# Patient Record
Sex: MALE | Race: BLACK OR AFRICAN AMERICAN
[De-identification: names, ages, dates, MRNs, and addresses within clinical notes are randomized per-mention and may not be internally consistent; named-entity substitution may affect disease eponyms.]

---

## 2021-02-20 ENCOUNTER — HOSPITAL ENCOUNTER (EMERGENCY)
Dept: HOSPITAL 53 - M ED | Age: 26
Discharge: HOME | End: 2021-02-20
Payer: COMMERCIAL

## 2021-02-20 VITALS — SYSTOLIC BLOOD PRESSURE: 114 MMHG | DIASTOLIC BLOOD PRESSURE: 78 MMHG

## 2021-02-20 VITALS — BODY MASS INDEX: 24.71 KG/M2 | HEIGHT: 67 IN | WEIGHT: 157.41 LBS

## 2021-02-20 DIAGNOSIS — S06.0X9A: Primary | ICD-10-CM
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
## 2021-02-20 PROCEDURE — 70450 CT HEAD/BRAIN W/O DYE: CPT

## 2021-02-20 PROCEDURE — 80047 BASIC METABLC PNL IONIZED CA: CPT

## 2021-02-20 PROCEDURE — 72125 CT NECK SPINE W/O DYE: CPT

## 2021-02-20 PROCEDURE — 36415 COLL VENOUS BLD VENIPUNCTURE: CPT

## 2021-02-20 PROCEDURE — 99284 EMERGENCY DEPT VISIT MOD MDM: CPT

## 2021-02-20 PROCEDURE — 96374 THER/PROPH/DIAG INJ IV PUSH: CPT

## 2021-02-20 NOTE — REP
INDICATION:

fall, headache.



COMPARISON:

None.



TECHNIQUE:

CT BRAIN PERFORMED IN THE AXIAL PLANE.  CORONAL RECONSTRUCTION IMAGES ARE PERFORMED.



FINDINGS:

THE VENTRICLES ARE NORMAL IN SIZE AND POSITION.  THERE IS NO MIDLINE SHIFT OR MASS

EFFECT.  GRAY-WHITE DIFFERENTIATION IS WELL MAINTAINED.  THERE IS NO ACUTE

INTRACRANIAL HEMORRHAGE OR EXTRA-AXIAL FLUID COLLECTION.  BONE WINDOW EXAMINATION IS

UNREMARKABLE.  VISUALIZED MASTOID AIR CELLS AND PARANASAL SINUSES ARE CLEAR.



IMPRESSION:

NEGATIVE NONCONTRAST CT BRAIN.





<Electronically signed by Sam Alejandro > 02/20/21 [id number]

## 2021-02-20 NOTE — REP
INDICATION:

fall, headache.



COMPARISON:

None.



TECHNIQUE:

Axial soft tissue and bone windows reviewed with coronal and sagittal bone window

reconstructions provided.



FINDINGS:

Sagittal images show vertebral body heights and disc spaces maintained.  There is no

compression deformity or malalignment.  There is no prevertebral soft tissue swelling.

The dens along with the anterior arch and lateral masses of C1 align normally on all

projections.  There is no central canal or foraminal stenosis.  The spinous processes,

lamina, pedicles, facets and transverse processes were all intact.  Skull base bone

windows and mastoid aeration unremarkable.  Limited visualization of the upper

thoracic vertebral levels and 1st few ribs along with lung apices all unremarkable.



IMPRESSION:

Normal noncontrast CT cervical spine.  No compression fracture, malalignment, spinal

or foraminal stenosis nor other acute finding.





<Electronically signed by Sam Alejandro > 02/20/21 [id number]

## 2021-02-20 NOTE — CCD
Summarization Of Episode

                             Created on: 2021



VANDANA LORENZANA

External Reference #: [id number]

: 1995

Sex: Male



Demographics





                          Address                   [address]

[place]  [address]

 

                          Home Phone                [phone]

 

                          Preferred Language        English

 

                          Marital Status            

 

                          Christianity Affiliation     IS

 

                          Race                      Black or 

 

                          Ethnic Group              Not  or 





Author





                          Author                    [facility]

 

                          Organization              [facility]

 

                          Address                   Unknown

 

                          Phone                     Unavailable







Support





                Name            Relationship    Address         Phone

 

                    [facility]             Next Of Kin         [address]

[place]

[place]  [address]                    Unavailable

 

                    ROBERTO LORENZANA    Next Of Kin         [address]

[address]

[place]  [address]                        [phone]







Care Team Providers





                    Care Team Member Name Role                Phone

 

                    FLORI OLIVER Unavailable         Unavailable

 

                    NO,  PCP            Unavailable         Unavailable



                                  



Re-disclosure Warning

          The records that you are about to access may contain information from 
federally-assisted alcohol or drug abuse programs. If such information is 
present, then the following federally mandated warning applies: This information
has been disclosed to you from records protected by federal confidentiality 
rules (42 CFR part 2). The federal rules prohibit you from making any further 
disclosure of this information unless further disclosure is expressly permitted 
by the written consent of the person to whom it pertains or as otherwise 
permitted by 42 CFR part 2. A general authorization for the release of medical 
or other information is NOT sufficient for this purpose. The Federal rules 
restrict any use of the information to criminally investigate or prosecute any 
alcohol or drug abuse patient.The records that you are about to access may 
contain highly sensitive health information, the redisclosure of which is 
protected by Article 27-F of the [facility] Public Health law. If you 
continue you may have access to information: Regarding HIV / AIDS; Provided by 
facilities licensed or operated by the [facility] Office of Mental Health; 
or Provided by the [facility] Office for People With Developmental 
Disabilities. If such information is present, then the following [place] 
mandated warning applies: This information has been disclosed to you from 
confidential records which are protected by state law. State law prohibits you 
from making any further disclosure of this information without the specific 
written consent of the person to whom it pertains, or as otherwise permitted by 
law. Any unauthorized further disclosure in violation of state law may result in
a fine or detention sentence or both. A general authorization for the release of 
medical or other information is NOT sufficient authorization for further disc
losure.                                                                         
    



Encounters

          



           Encounter  Providers  Location   Date       Indications Data Source(s

)

 

                Emergency       Attender: JOZEF OLIVERConsultant: PCP NO 

                2021 12:30:00 

PM EST - 2021 03:14:00 PM EST                           [facility]

ital

 

                                        Patient discharged. 



                                                                    



Insurance Providers

          



             Payer name   Policy type / Coverage type Policy ID    Covered party

 ID Covered 

party's relationship to kothari Policy Kothari             Plan Information

 

          [facility] [employer]           [id number]           SP             

     [id number]

 

          [facility] [employer] - O/P           [id number]           [id number]            

      [id number]



                                                                                
                 



Results

          



                    ID                  Date                Data Source

 

                    [id number]     2021 01:02:00 PM [facility] [address] [place] [address] PHONE: [phone]

FAX: [phone]  Name .................. : SALOMÓN MEJIA               Acct 
Number.................. : [id number] ROOM. ................. : TR-07             
            Number ................... : [id number] Stay type ............. : E/R 
                         Discharge Date......... ... : 21 Admit Date ....
..... : 21                         Admit Phys .................... : 
MELODY Date of Birth ....... : 1995                      Family Phys 
................... : NO PCP Phone .................. : [phone]            
    Age ................................ : 25 Film# .................. .:[id number] 
                     Sex ................................. : M  Unsigned 
transcriptions are preliminary reports and do not represent a medical or legal 
document         CT THORAX W/CONTRAST                [id number]     COMPLETE:21
19:12 IRWIN 4606                                        Reason(s): Trauma/Injury  
  CT OF THE CHEST WITH CONTRAST:  INDICATION: Trauma.  FINDINGS: The neck base 
is clear.  The lungs are clear.  The heart is normal in size. No 
lymphadenopathy. No vascular injury.  There is no acute osseous abnormality.  
The visualized upper abdomen demonstrates no acute abnormality.  IMPRESSION: No 
acute traumatic injury of the chest.  While performing the above CT examination,
radiation dose reduction was accomplished utilizing automated exposure control, 
adjusting of the mA and kV based on the patient's body size and/or the use of 
imperative reconstructive techniques.  CT dose: 393.7 mGycm  Contrast agent in 
mL: 75 Isovue 370  Method of administration: Intravenous   
___________________________________ Electronically Reviewed and Signed By Juma Gu M.D.              , 21 13:02, [facility]                               
                                                                    Page 1 of [address] [place] [address] PHONE: [phone] 
FAX: [phone]  Name .................. : SALOMÓN MEJIA                 
Acct Number.................. : [id number] ROOM. ................. : TR-07        
                   Number ................... : [id number] Stay type .............
: E/R                             Discharge Date......... ... : 21 Admit 
Date ......... : 21                           Admit Phys ............
........ : MELOYD Date of Birth ....... : 1995                        
Family Phys ................... : NO PCP Phone .................. : [phone]
                  Age ................................ : 25 Film# 
.................. .:[id number]                         Sex 
................................. : M  Unsigned transcriptions are preliminary 
reports and do not represent a medical or legal document         CT THORAX 
W/CONTRAST                  [id number]     COMPLETE:21 19:12 IRWIN 4606          
                               Reason(s): Trauma/Injury    Transcribe Initials: 
KENIA , Transcribe Date: 21 20:50, Dictation Date:   Copy for: NEVIN FAGAN                      via fax Copy for: EMERGENCY DEPT                    
via modem Copy for: 710 MED REC DISCHARGED                                      
                                                                  Page 2 of 2   









          Name      Value     Range     Interpretation Code Description Data Gisele

rce(s) Supporting 

Document(s)

 

                                                                       









                    ID                  Date                Data Source

 

                    [id number]     2021 12:30:00 [place] [address] PHONE: [phone]

FAX: [phone]  Name .................. : LORENZANA VANDANA ROBERTO              Acct 
Number.................. : [id number] ROOM. ................. : TR-07             
          MR Number ................... : [id number] Stay type ............. : E/R  
                       Discharge Date......... ... : Admit Date ......... :                         Admit Phys .................... : MELODY Date of
Birth ....... : 1995                     Family Phys ................... :
NO PCP Phone .................. : [phone]                Age 
................................ : 25 Film# .................. .:[id number]         
            Sex ................................. : M  Unsigned transcriptions 
are preliminary reports and do not represent a medical or legal document        
 CT CERV SPINE W/O CONTRAS 55040       COMPLETE:21 12:39                
4608                                Reason(s): Trauma/Injury     CT SCAN OF THE 
CERVICAL SPINE WITHOUT CONTRAST, 21:  INDICATION: Trauma/injury.  
FINDINGS: Scans were obtained from C1 through C7. The discs appeared normal. No 
bony abnormalities, spinal stenosis, abnormal paravertebral process or 
significant degenerative changes are seen. There is a 4 mm bone island 
identified in the left occiput. Examination is otherwise unremarkable.  
IMPRESSION: Negative cervical spine CT scan.  While performing the above CT 
examination, radiation dose reduction was accomplished utilizing automated 
exposure control, adjusting of the mA and kV based on the patient's body size 
and/or the use of imperative reconstructive techniques.  CT dose 1087.8 mGycm.  
Examination dictated by GABINO Cartwright. Examination was reviewed with 
Mtat Dwyer MD, radiologist at the time of this dictation.    
___________________________________ Electronically Reviewed and Signed By Matt Dywer MD                 , 21 12:30, [facility]  Transcribe Initials: [facility], 
Transcribe Date: 21 14:20, Dictation Date:   Copy for: NEVIN FAGAN    
              via fax                                                           
                                       Page 1 of 2 [facility] [address] [place] [address] PHONE: [phone] FAX: [phone]  Name 
.................. : SALOMÓN MEJIA               Acct Number..................
: [id number] ROOM. ................. : TR-                         MR Number 
................... : [id number] Stay type ............. : E/R                      
    Discharge Date......... ... : Admit Date ......... : 21               
         Admit Phys .................... : MELODY Date of Birth ....... : 
1995                      Family Phys ................... : NO PCP Phone 
.................. : [phone]                 Age 
................................ : 25 Film# .................. .:[id number]         
             Sex ................................. : M  Unsigned transcriptions 
are preliminary reports and do not represent a medical or legal document        
 CT CERV SPINE W/O CONTRAS 82106       COMPLETE:21 12:39                 
4608                                Reason(s): Trauma/Injury   Copy for: 
EMERGENCY DEPT                    via modem Copy for: 710 MED REC DISCHARGED    
                                                                                
                 Page 2 of 2   









          Name      Value     Range     Interpretation Code Description Data Gisele

rce(s) Supporting 

Document(s)

 

                                                                       









                    ID                  Date                Data Source

 

                    [id number]     2021 12:29:00 PM EST [place] [address]

[place] [address] PHONE: [phone]

FAX: [phone]  Name .................. : SLAOMÓN MEJIA               Acct 
Number.................. : [id number] ROOM. ................. : [facility]             
           MR Number ................... : [id number] Stay type ............. : E/R 
                         Discharge Date......... ... : Admit Date ......... : 0
21                         Admit Phys .................... : [facility] 
Date of Birth ....... : 1995                      Family Phys 
................... : NO PCP Phone .................. : [phone]            
    Age ................................ : 25 Film# .................. .:[id number] 
                     Sex ................................. : M  Unsigned 
transcriptions are preliminary reports and do not represent a medical or legal 
document           CT HEAD W/O CONTRAST              11020    COMPLETE:21 
12:39          2067                                        Reason(s): Head 
Injury     CT SCAN OF THE HEAD WITHOUT CONTRAST, 21:  INDICATION: Head 
injury.  FINDINGS: The ventricular system is normal.  No evidence for 
parenchymal loss is noted. No evidence for masses or mass effects are 
identified. No evidence for subdural, epidural, or subarachnoid hemorrhage is 
noted. The bones and soft tissues also appear normal.  IMPRESSION: Unremarkable 
CT scan of the head without intravenous contrast.  While performing the above CT
examination, radiation dose reduction was accomplished utilizing automated 
exposure control, adjusting of the mA and kV based on the patient's body size 
and/or the use of imperative reconstructive techniques.  CT dose 894.8 mGycm.  
Examination dictated by GABINO Cartwright. Examination was reviewed with 
Matt Dwyer MD, radiologist at the time of this dictation.    
___________________________________ Electronically Reviewed and Signed By Matt Dwyer MD                 , 21 12:29, [facility]  Transcribe Initials: [facility], 
Transcribe Date: 21 14:19, Dictation Date:                                
                                                                     Page 1 of 2
[place] [address] PHONE: [phone] 
FAX: [phone]  Name .................. : SALOMÓN ROSS ROBERTO                  
Acct Number.................. : [id number] ROOM. ................. : TR-07        
                   MR Number ................... : [id number] Stay type 
............. : E/R                              Discharge Date......... ... : 
Admit Date ......... : 21                            Admit Phys .....
............... : [facility] Date of Birth ....... : 1995                 
       Family Phys ................... : NO PCP Phone .................. : 
[phone]                    Age ................................ : 25 Film# 
.................. .:[id number]                          Sex 
................................. : M  Unsigned transcriptions are preliminary 
reports and do not represent a medical or legal document           CT HEAD W/O 
CONTRAST                 07757    COMPLETE:21 12:39          4607         
                                 Reason(s): Head Injury    Copy for: NEVIN FAGAN                      via fax Copy for: EMERGENCY DEPT                    
via modem Copy for: 710 MED REC DISCHARGED                                      
                                                                   Page 2 of 2  










          Name      Value     Range     Interpretation Code Description Data Gisele

rce(s) Supporting 

Document(s)

 

                                                                       









                    ID                  Date                Data Source

 

                    [id number]     2021 12:28:00 [place] [address] PHONE: [phone]

FAX: [phone]  Name .................. : SALOMÓN MEJIA                Acct
Number.................. : [id number] ROOM. ................. : TR-07             
             Number ................... : [id number] Stay type ............. : E/R
                           Discharge Date......... ... : Admit Date ......... : 
21                          Admit Phys .................... : [facility] 
Date of Birth ....... : 1995                       Family Phys 
................... : NO PCP Phone .................. : [phone]            
     Age ................................ : 25 Film# .................. .:[id number]
                       Sex ................................. : M  Unsigned 
transcriptions are preliminary reports and do not represent a medical or legal 
document       KNEE COMPLETE-4 OR MORE VWS R [id number] COMPLETE:21 12:39    
                   4609                              Reason(s): Trauma/Injury   
 RIGHT KNEE, 21:  INDICATION: Trauma/injury.  FINDINGS: There is normal 
alignment and position of the bones of the right knee. No evidence for joint 
effusion is noted. No fractures are identified.  IMPRESSION: Negative right knee
x-ray.  Examination dictated by GABINO Cartwright. Examination was reviewed 
with Matt Dwyer MD, radiologist at the time of this dictation.    
___________________________________ Electronically Reviewed and Signed By Matt Dwyer MD                 , 21 12:28, [facility]  Transcribe Initials: SSR, 
Transcribe Date: 21 14:17, Dictation Date:   Copy for: NEVIN FAGAN    
                 via fax Copy for: EMERGENCY DEPT                    via modem 
Copy for: 710 MED REC DISCHARGED                                                
                                                       Page 1 of 1   









          Name      Value     Range     Interpretation Code Description Data Gisele

rce(s) Supporting 

Document(s)

 

                                                                       









                    ID                  Date                Data Source

 

                    [id number]      2021 12:30:00 PM EST [facility]

 

                                                                                

                                        

                1                                            OrderSheet         
                            [facility]                              
       Emergency Department                                [address] [place] [address]                                  Phone #: [phone]
[phone]                                          2021 12:20                  
     ----------------------------------------------------                       
            Patient: VANDANA LORENZANA                                  MRN: 
[id number]      Acct#: [id number]                                Sex: M : 
1995 Age: 25yWEIGHT:72.5 kg (S) HEIGHT:67 inches (S) BMI:25.1ALLERGIES: No
Known Drug AllergyCHIEF COMPLAINT: 5-6 feet, trippedDIAGNOSIS: Fall, Strain of 
neck muscle, Backache, Contusion, Injury of headLAB ORDERSOrder Description     
  Priority       Entered                Acknowledged     InitialedCBC w Diff    
          STAT           12:39 2021                        13:11 Avery Rainey R.N.;CMP                      
STAT           12:39 2021                        13:11 Avery Rainey R.N.;PT/PTT                   STAT           
12:39 2021                        13:11 Avery Rainey R.N.;Urine Drug Screen        STAT           12:39 2021 
                      13:11 Avery Rainey R.N.;Urinalysis (Clean        STAT           12:39 2021                  
     13:11 Brigid,Catch)                                 Avery LLOYD;ETOH       
             STAT           12:39 2021                        13:11 
Avery Rainey R.N.;DIAGNOSTIC STUDY 
ORDERSOrder Description  Priority     Entered                        
Acknowledged     InitialedCT Chest W/ Cont   STAT         12:39 2021      
                         13:10 Brigid,(Oxygen?(No))                  Avery Jurado R.N.(IV?(Yes))                 
   PA;                  Reason for Study: Trauma/InjuryCT Head W/O Cont   STAT  
      12:39 2021                                13:10 
Brigid,(Oxygen?(No))                  Avery LLOYD;                  Reason for 
Study: Head InjuryCT Spine Cervical  STAT         12:39 2021              
                 13:10 Brigid,W/O Cont                       Avery Jurado R.N.                                         
                                                      2                         
                OrderSheet                                    [facility]                                    Emergency Department                
             [address] [place] [address]                               
Phone #: [phone]                                        
2021 12:20                      
----------------------------------------------------                            
     Patient: VANDANA LORENZANA                                MRN: [id number]      
Acct#: [id number]                              Sex: M : 1995 Age: 
25y(Oxygen?(No))                       PA;                       Reason for 
Study: Trauma/InjuryKnee Complete           STAT         12:39 2021       
                 13:10 Brigid,Right                               Avery Jurado R.N.(Oxygen?(No))                     
 PA;                       Reason for Study: 
Trauma/InjuryMEDICATION/IV/DRIP/FLUID ORDERSOrder Description    Priority       
Entered                Acknowledged      InitialedOfirmev IV 1000 mg            
      12:39 2021                         13:29 Brigid,(NOW x1, Infuse    
                Avery Jurado R.N.over 15 
minutes)                    PA;GENERAL ORDERSOrder Description       Priority   
 Entered                Acknowledged      InitialedSaline Lock                  
       12:39 2021                         13:10 Avery Rainey R.N.            
                       PA;[Electronically signed by Alison Slater R.N. (15:14 
2021)][Electronically signed by Avery Weir (21:06 2021)][E
lectronically locked by Alison Slater R.N. (15:14 2021)]  









          Name      Value     Range     Interpretation Code Description Data Gisele

rce(s) Supporting 

Document(s)

 

                                                                       









                    ID                  Date                Data Source

 

                    [id number]      2021 12:30:00 PM EST [facility]

 

                                                                                

                                        

1                           Medication Reconciliation Report                    
              [facility]                                   Emergency
Department                             [address] [place] [address]     
                         Phone #: [phone]- [phone]                      
                2021 12:20                     
----------------------------------------------------                            
    Patient: VANDANA LORENZANA                               MRN: [id number]      
Acct#: [id number]                             Sex: M : 1995 Age: 
25yWeight:      72.5 kgHeight/Length:      67 in.BMI:         25.1ALLERGIES: No 
Known Drug AllergyThe patient's Home Medications are listed below:NONE.The 
source(s) of the original Home Medication information:patientThe following 
Medications were given to the patient in the Emergency Department:ofirmev IVPB 
bolus 0, then 1 GRAM, administered: 13:29 2021The following Medications 
were prescribed to the patient:None.  









          Name      Value     Range     Interpretation Code Description Data Gisele

rce(s) Supporting 

Document(s)

 

                                                                       









                    ID                  Date                Data Source

 

                    [id number]      2021 12:30:00 PM [facility]

 

                                                                                

                                        

        1                              Medication Administration Record         
                            [facility]                              
       Emergency Department                                [address] [place] [address]                                  Phone #: [phone]
[phone]                                          2021 12:20                  
     ----------------------------------------------------                       
            Patient: VANDANA LORENZANA                                  MRN: 
[id number]      Acct#: [id number]                                Sex: M : 
1995 Age: 25yWeight: 72.5 kgHeight/Length: 67 inBMI:    25.1ALLERGIES:    
  No Known Drug Allergy      Date/Time                  Medication Administered 
         Medication OrderedStart                         ofirmev *              
         Ofirmev IV 1000 mg (NOW x1,13:29 2021              Dose: 1 GRAM *
IVPB              Infuse over 15 minutes)Adrien Rainey R.N.----Stop14:09 
Adrien cleveland R.N.  









          Name      Value     Range     Interpretation Code Description Data Gisele

rce(s) Supporting 

Document(s)

 

                                                                       









                    ID                  Date                Data Source

 

                    [id number]      2021 12:30:00 PM [facility]

 

                                                                                

                                        

                          1                                      General 
Instructions                                    [facility]          
                         Emergency Department                              [address] [place] [address]                                Phone #: [phone]- [phone]                                        2021 12:20      
               ----------------------------------------------------             
                    Patient: VANDANA LORENZANA                                
MRN: [id number]      Acct#: [id number]                              Sex: M : 
1995 Age: 25yMinor closed head injury. Concussion. No loss of 
consciousness. (Mild).Acute cervical strain.Acute traumatic thoracic back pain 
associated with muscle strain.Contusion to the right knee.Fall on the same level
by tripping.INSTRUCTIONSDo not work today.Warnings: Further evaluation is ne
cessary. It is very important to follow up with a healthcare provider.GENERAL 
WARNINGS: Return or contact your physician immediately if your condition worsens
orchanges unexpectedly, if not improving as expected, or if other problems 
arise. SPECIFICALLY, return ifthere is no improvement in the pain.Your Current 
Medications: .No home medication.Follow-up:Follow up with your doctor tomorrow 
even if well. Reason for referral: evaluation and treatment. Summaryof care 
provided to patient.Understanding of the discharge instructions verbalized by 
patient.                                   ADDITIONAL INFORMATIONMechanical 
FallYou have had a fall today. It appears that the cause is what is called 
mechanical. That means thatyou slipped, tripped, or lost your balance. If your 
fall had been because of fainting or a seizure, youmight need other tests.It is 
normal to feel sore and tight in your muscles and back the next day, and not 
just the muscles youinjured at first. Remember, all the parts of your body are 
connected, so while initially one area hurts,the next day another may hurt. 
Also, when you injure yourself, it causes inflammation, which thencauses the 
muscles to tighten up and hurt more. After the initial worsening, it should 
graduallyimprove over the next few days. Do report more severe pain.            
                                                                                
             2                                       General Instructions       
                            [facility]                              
     Emergency Department                              [address] [place] [address]                                Phone #: [phone]- [phone]                                        2021 12:20                    
 ----------------------------------------------------                           
      Patient: VANDANA LORENZANA                                MRN: [id number]      
Acct#: [id number]                              Sex: M : 1995 Age: 25yEven
without a definite head injury, you can still get a concussion from your head 
suddenly jerkingforward, backward, or sideways when falling. Concussions and 
even bleeding can still happen,especially if you have had a recent injury or 
take blood thinner medicine. It is not unusual to have amild headache and feel 
tired and even nauseous or dizzy.Home care   Rest today and go back to your 
normal activities when you are feeling back to normal.    If you were injured 
during the fall, follow the advice from your healthcare provider regarding    
care of your injury.    At first, do not try to stretch out the sore spots. If 
there is a strain, stretching may make it    worse. Massage may help relax the 
muscles without stretching them.     You can use an ice pack or cold compress on
and off to the sore spots 10 to 20 minutes at a    time, as often as you feel 
comfortable. This may help reduce the inflammation, swelling and    pain.    If 
you have any scrapes or abrasions, they usually heal within 10 days. It is 
important to keep    the abrasions clean while they initially start to heal. 
However, an infection may happen even    with proper care, so watch for early 
signs of infection (such as warmth, redness, or swelling).Medicines   Talk to 
your healthcare provider before taking new medicines, especially if you have 
other    medical problems or are taking other medicines.    If you need anything
for pain, you can take acetaminophen or ibuprofen, unless you were    given a 
different pain medicine to use. Talk with your healthcare provider before using 
these    medicines if you have chronic liver or kidney disease, or ever had a 
stomach ulcer    or gastrointestinal bleeding, or are taking blood thinner 
medicines.    Be careful if you are given prescription pain medicines, narcotic
s, or medicine for muscle    spasm. They can make you sleepy and dizzy, and can 
affect your coordination, reflexes, and    judgment. Do not drive or do work 
where you can injure yourself when taking them.Fall prevention   Fix, remove, or
replace anything that caused your fall.    Make your home safe by keeping 
walkways clear of objects you may trip over.    Use nonslip pads under rugs. 
Don't use small area rugs or throw rugs.    Don't walk in poorly lit areas.     
                                                                                
                3                                      General Instructions     
                                 [facility]                         
             Emergency Department                                 [address] [place] [address]                                   Phone #: [phone]
[phone]- [phone]                                           2021 12:20       
                 ----------------------------------------------------           
                         Patient: VANDANA LORENZANA                              
    MRN: [id number]      Acct#: [id number]                                 Sex: M :
1995 Age: 25y    Don't stand on chairs or wobbly ladders.    Use caution 
when reaching overhead or looking upward. This position can cause a loss of    
balance.    Be sure your shoes fit properly, have nonslip bottoms and are in 
good condition.    Be cautious when going up and down curbs, and walking on 
uneven sidewalks.    If your balance is poor, consider using a cane or walker.  
 Stay as active as you can. Balance, flexibility, strength, and endurance all 
come from    exercise. They all play a role in preventing falls.    If you have 
pets, know where they are before you stand up or walk so you don't trip over    
them.    Limit alcohol intake. Alcohol can cause balance problems and increase 
the risk of falls.    Use night lights.    Have your eyes tested to be sure you 
are seeing well, even if you already wear glasses.Follow-upFollow up with your 
healthcare provider, or as advised. If X-rays or CT scans were done, you will 
benotified if there is a change in the reading, especially if it affects 
treatment.Call [phone] if any of these happen:    Trouble breathing    
Confused or difficulty arousing    Fainting or loss of consciousness    Rapid or
very slow heart rate    Seizure    Difficulty with speech or vision, weakness of
an arm or leg    Difficulty walking or talking, loss of balance, numbness or 
weakness in one side of your body,    or facial droopWhen to seek medical advice
                                                                                
                                                             4                  
                                General Instructions                            
                [facility]                                          
  Emergency Department                                       [address] [place] [address]                                         Phone #: [phone]
[phone]- [phone]                                                 2021 12:20 
                             ------------------------------------
----------------                                           Patient: VANDANA LORENZANA                                         MRN: [id number]      Acct#: 
[id number]                                       Sex: M : 1995 Age: 
25yCall your healthcare provider right away if any of these happen:       
Repeated mechanical falls, or unexplained falls       Dizziness       Severe 
headache       Blood in vomit, stools (black or red color) [id number] [employer] 
[employer]. [address] [place] [address], [place] [address]. All rights 
reserved. This information is not intended as asubstitute for professional medi
[facility] care. Always follow your healthcare professional's instructions.Neck Sprain 
or StrainA sudden force that causes turning or bending of the neck can cause 
sprain or strain. An examplewould be the force from a car accident. This can 
stretch or tear muscles called a strain. It can alsostretch or tear ligaments 
called a sprain. Either of these can cause neck pain. Sometimes neck painoccurs 
after a simple awkward movement. In either case, muscle spasm is commonly 
present andcontributes to the pain.Unless you had a forceful physical injury 
(for example, a car accident or fall), X-rays are often notordered for the 
initial evaluation of neck pain. If pain continues and does not respond to 
medicaltreatment, X-rays and other tests may be done later.Home care     You may
feel more soreness and spasm the first few days after the injury. Rest until 
symptoms      start to improve.      When lying down, use a comfortable pillow 
or a rolled towel that supports the head and keeps      the spine in a neutral p
osition. The position of the head should not be tilted forward or      backward.
      Apply an ice pack over the injured area for 15 to 20 minutes every 3 to 6 
hours. Do this for the      first 24 to 48 hours. You can make an ice pack by 
filling a plastic bag that seals at the top with      ice cubes and then 
wrapping it with a thin towel. After 48 hours, apply heat (warm shower or      
warm bath) for 15 to 20 minutes several times a day, or alternate ice and heat. 
    You may use over-the-counter pain medicine to control pain, unless another 
pain medicine      was prescribed. If you have chronic liver or kidney disease 
or ever had a stomach ulcer or      gastrointestinal bleeding, talk with your 
healthcare provider before using these medicines.      If a soft cervical collar
was prescribed, only ear it for periods of increased pain. It should not      be
worn for more than 3 hours a day, or for longer than 1 to 2 weeks.              
                                                                                
                                               5                                
                  General Instructions                                          
  [facility]                                             Emergency 
Department                                       [address] [place] [address]                                         Phone #: [phone]- [phone] 
                                               2021 12:20                 
             ----------------------------------------------------               
                           Patient: VANDANA LORENZANA                            
            MRN: [id number]      Acct#: [id number]                                    
  Sex: M : 1995 Age: 25yFollow-up careFollow up with your healthcare 
provider, or as directed. Physical therapy may be needed.Sometimes fractures 
don't show up on the first X-ray. Bruises and sprains can sometimes hurt asmuch 
as a fracture. These injuries can take time to heal completely. If your symptoms
don't improveor they get worse, talk with your healthcare provider. You may need
a repeat X-ray or other tests. IfX-rays were taken, you will be told of any new 
findings that may affect your care.Call [phone] [phone] if you have:       Neck 
swelling, difficulty or painful swallowing       Trouble breathing       Chest 
painWhen to seek medical adviceCall your healthcare provider right away if any 
of these occur:       Pain becomes worse or spreads into your arms or legs      
Weakness or numbness in one or both arms or legs [id number] [employer]. [address] [place] [address]. All rights reserved. This 
information is not intended as asubstitute for professional medical care. Always
follow your healthcare professional's instructions.Back Pain (Acute or Chronic) 
                                                                                
                  6                                   General Instructions      
                           [facility]                               
  Emergency Department                            [address] [place] [address]                              Phone #: [phone]- [phone]            
                         2021 12:20                    
----------------------------------------------------                            
   Patient: VANDANA LORENZANA                              MRN: [id number]      
Acct#: [id number]                            Sex: M : 1995 Age: 25yBack 
pain is one of the most common problems. The good news is that most people feel 
better in 1 to2 weeks, and most of the rest in 1 to 2 months. Most people can 
remain active.People who have pain describe it differently--not everyone is the 
same.   The pain can be sharp, stabbing, shooting, aching, cramping or burning. 
 Movement, standing, bending, lifting, sitting, or walking may worsen pain.   It
can be limited to one spot or area, or it can be more generalized.   It can 
spread upwards, to the front, or go down your arms or legs (sciatica).   It can 
cause muscle spasm.Most of the time, mechanical problems with the muscles or 
spine cause the pain. Mechanicalproblems are usually caused by an injury to the 
muscles or ligaments. Illness can cause back pain,but it's usually not caused by
a serious illness. Mechanical problems include:                                 
                                                                            7   
                                    General Instructions                        
           [facility]                                    Emergency 
Department                              [address] [place] [address]    
                           Phone #: [phone]- [phone]                    
                   2021 12:20                      
----------------------------------------------------                            
     Patient: VANDANA LORENZANA                                MRN: [id number]      
Acct#: [id number]                              Sex: M : 1995 Age: 25y    
Physical activity such as sports, exercise, work, or normal activity    
Overexertion, lifting, pushing, pulling incorrectly or too aggressively    
Sudden twisting, bending, or stretching from an accident, or accidental movement
   Poor posture    Stretching or moving wrong, without noticing pain at the time
   Poor coordination, lack of regular exercise (check with your doctor about 
this)    Spinal disc disease or arthritis    StressPain can also be related to 
pregnancy, or illness like appendicitis, bladder or kidney infections, 
pelvicinfections, and many other things.Acute back pain usually gets better in 1
to 2 weeks. Back pain related to disk disease, arthritis in thespinal joints, or
narrowing of the spinal canal (spinal stenosis) can become chronic and last 
formonths or years.Unless you had a physical injury such as a car accident or 
fall, X-rays are usually not needed for thefirst assessment of back pain. If 
pain continues and does not respond to medical treatment, you mayneed X-rays and
other tests.Home careTry this home care advice:    When in bed, try to find a 
position of comfort. A firm mattress is best. Try lying flat on your    back 
with pillows under your knees. You can also try lying on your side with your 
knees bent    up toward your chest and a pillow between your knees.    At first,
don't try to stretch out the sore spots. If there is a strain, it's not like the
good soreness    you get after exercising without an injury. In this case, 
stretching may make it worse.    Don't sit for long periods, as in a long car 
ride or during other travel. This puts more stress on    the lower back than 
standing or walking.    During the first 24 to 72 hours after an acute injury or
flare up of chronic back pain, apply an    ice pack to the painful area for 20 
minutes and then remove it for 20 minutes. Do this over a    period of 60 to 90 
minutes or several times a day. This will reduce swelling and pain. Wrap the    
ice pack in a thin towel or plastic to protect your skin.    You can start with 
ice, then switch to heat. Heat (hot shower, hot bath, or heating pad)    reduces
pain and works well for muscle spasms. Heat can be applied to the painful area 
for 20                                                                          
                               8                                       General 
Instructions                                    [facility]          
                         Emergency Department                              [address] [place] [address]                                Phone #: [phone]- [phone]                                        2021 12:20      
               ----------------------------------------------------             
                    Patient: VANDANA LORENZANA                                
MRN: [id number]      Acct#: [id number]                              Sex: M : 
1995 Age: 25y    minutes then remove it for 20 minutes. Do this over a 
period of 60 to 90 minutes or several    times a day. Don't sleep on a heating 
pad. It can lead to skin burns or tissue damage.    You can alternate ice and h
eat therapy. Talk with your doctor about the best treatment for    your back 
pain.    Therapeutic massage can help relax the back muscles without stretching 
them.    Be aware of safe lifting methods and don't lift anything without 
stretching first.MedicinesTalk to your doctor before using medicine, especially 
if you have other medical problems or are takingother medicines.    You may use 
over-the-counter medicine as directed on the bottle to control pain, unless    
another pain medicine was prescribed. If you have chronic conditions like 
diabetes, liver or    kidney disease, stomach ulcers, or gastrointestinal 
bleeding, or are taking blood thinners, talk    to your doctor before taking any
medicine.    Be careful if you are given a prescription medicines, narcotics, or
medicine for muscle    spasms. They can cause drowsiness, affect your 
coordination, reflexes, and judgement. Don't    drive or operate heavy 
machinery.Follow-up careFollow up with your healthcare provider, or as 
advised.If X-rays were taken, you will be told of any new findings that may 
affect your careCall 911Call 911 if any of the following occur:    Trouble 
breathing    Confusion    Very drowsy or trouble awakening    Fainting or loss 
of consciousness    Rapid or very slow heart rate    Loss of bowel or bladder 
controlWhen to seek medical advice                                              
                                                                                
               9                                                   General 
Instructions                                             [facility] 
                                           Emergency Department                 
                     [address] [place] [address]                       
                 Phone #: [phone]- [phone]                              
                  2021 12:20                               
----------------------------------------------------                            
              Patient: VANDANA LORENZANA                                         
MRN: [id number]      Acct#: [id number]                                       Sex: M 
: 1995 Age: 25yCall your healthcare provider right away if any of these
occur:       Pain becomes worse or spreads to your legs       Weakness or 
numbness in one or both legs       Numbness in the groin or genital area -
 [employer]. [address] [place] [address], [place] [address]. All 
rights reserved. This information is not intended as asubstitute for 
professional medical care. Always follow your healthcare professional's instruct
ions.Lower Extremity BruiseYou have a bruise (contusion on a leg, knee, ankle, 
foot, or toe. Symptoms include pain, swelling,and skin discoloration. No bones 
are broken. This injury may take from a few days to a few weeks toheal. During 
that time, the bruise may change from reddish in color, to purple-blue, to 
green-yellow,to yellow-brown.Home care     Unless another medicine was 
prescribed, you can take acetaminophen, ibuprofen, or      naproxen to control 
pain. Talk with your healthcare provider before using these medicines if      
you have chronic liver or kidney disease or ever had a stomach ulcer or 
digestive bleeding.      Elevate the injured area to reduce pain and swelling. 
As much as possible, sit or lie down with      the injured area raised about the
level of your heart. This is especially important during the first      48 
hours.      Ice the injured area to help reduce pain and swelling. Wrap an ice 
pack or ice cubes in a      plastic bag in a thin towel. Apply to the bruised 
area for 20 minutes every 1 to 2 hours the first      day. Continue this 3 to 4 
times a day until the pain and swelling goes away.      If crutches have been 
advised, don't bear full weight on the injured leg until you can do so      
without pain. You may return to sports when you are able to put full weight and 
impact on the      injured leg without pain.Follow upFollow up with your 
healthcare provider, or as advised. Call if you are not improving within the 
next 1to 2 weeks.When to seek medical adviceCall your healthcare provider right 
away if any of these occur:       Increased pain or swelling                    
                                                                                
                                         10                                     
             General Instructions                                             
[facility]                                             Emergency 
Department                                       [address] [place] [address]                                         Phone #: [phone] ext- [phone] 
                                               2021 12:20                 
             ----------------------------------------------------               
                           Patient: VANDANA LORENZANA                            
            MRN: [id number]      Acct#: [id number]                                    
  Sex: M : 1995 Age: 25y       Foot or toes become cold, blue, numb or 
tingly       Signs of infection: Warmth, drainage, or increased redness or pain 
around the injury       Inability to move the injured area, or any joints below 
the injured area       Frequent bruising for unknown reasons [id number] [employer]. [address] [place] [address]. All rights res
erved. This information is not intended as asubstitute for professional medical 
care. Always follow your healthcare professional's instructions.ConcussionA 
concussion is a type of brain injury. It can be caused by a direct hit or blow 
to the head, neck, face,or body. The force of the blow makes the head and brain 
shake quickly back and forth. In some casesyou may lose consciousness. Depending
on the severity of the blow, it will take from a few hours upto a few days to 
get better. Sometimes symptoms may last a few months or longer. This is 
calledpost-concussion syndrome.At first, you may have a headache, nausea, 
vomiting, or dizziness. You may also have problemsconcentrating or remembering 
things. This is normal.Symptoms should get better as the hours and days go by. 
Symptoms that get worse could be a signof a more serious brain injury. This 
might be a bruise or bleeding in the brain. That's why it'simportant to watch 
for the warning signs listed below.School-age children are more at risk for symp
toms that don't go away after a concussion. Theyshould be watched very closely. 
                                                                                
                     11                                     General Instructions
                                  [facility]                        
          Emergency Department                             [address] [place] [address]                               Phone #: [phone] ext- [phone] 
                                     2021 12:20                     
----------------------------------------------------                            
    Patient: VANDANA LORENZANA                               MRN: [id number]      
Acct#: [id number]                             Sex: M : 1995 Age: 25yHome 
careIf your injury is mild and there are no serious signs or symptoms, your 
healthcare provider mayrecommend that you be watched at home. If there is 
evidence that the injury is more serious, you willbe watched in the hospital. 
Follow these tips to help care for yourself at home:    After a concussion, your
healthcare provider may recommend that a family member or friend    watched you 
for 12 to 24 hours. They may be told to wake you every few hours during sleep to
   check for the signs below.    If your face or scalp swells, apply an ice pack
for 20 minutes every 1 to 2 hours. Do this until    the swelling starts to go 
down. To make an ice pack, put ice cubes in a plastic bag that seals at    the 
top. Wrap the bag in a clean, thin towel or cloth. Never put ice or an ice pack 
directly on the    skin.    You may use acetaminophen to control pain, unless 
another pain medicine was prescribed.    Don't use aspirin or ibuprofen after a 
head injury. If you have long-lasting (chronic) liver or    kidney disease, talk
with your healthcare provider before using these medicines. Also talk with    
your provider if you ever had a stomach ulcer or gastrointestinal bleeding.    
For the next 24 hours:       o    Don't drink alcohol or take sedatives or 
medicines that make you sleepy.       o    Don't drive or operate machinery.    
  o    Don't do anything strenuous. Don't lift or strain.    Don't return right 
away to sports or to any activity where you could hit your head. Wait until all 
  symptoms are gone and you have been cleared by your healthcare provider. 
Having a second    head injury before you fully recover from the first one can 
lead to serious brain injury.    After a few days, it's OK to go back to your 
normal daily activities. But don't do anything that    could cause your head to 
be hit again.Follow-up careFollow up with your healthcare provider in 1 week, or
as directed.A radiologist will review any X-rays or CT scans that were taken. 
You will be told of any new findingsthat may affect your care.When to seek 
medical adviceCall your healthcare provider right away if any of these occur:   
Headache or dizziness that won't go away                                        
                                                                                
                     12                                                   
General Instructions                                             [facility]                                             Emergency Department       
                               [address] [place] [address]             
                           Phone #: [phone] ext- [phone]                    
                            2021 12:20                               
----------------------------------------------------                            
              Patient: VANDANA LORENZANA                                         
MRN: [id number]      Acct#: [id number]                                       Sex: M 
: 1995 Age: 25y       Redness, warmth, or pus from the swollen areaCall
[facility] or get medical care right away if any of these occur:       Repeated
vomiting (it's common to vomit once after a head injury)       Headache or dizz
iness that is severe or gets worse       Loss of consciousness       Unusual 
drowsiness, or unable to wake up as usual       Weakness or decreased ability to
walk or move any limb       Confusion, agitation, or change in behavior or 
speech, or memory loss       Blurred vision       Convulsion (seizure)       
Swelling on the scalp or face that gets worse       Changes in pupil size (the 
black part of the eye)       Fluid draining from or bleeding from the nose or 
ears [id number] [employer]. [address] [place] [address] 
[address]. All rights reserved. This information is not intended as asubstitute for 
professional medical care. Always follow your healthcare professional's 
instructions. You have been given the following additional information: 
Mechanical Fall Neck Sprain or Strain Back Pain (Acute or Chronic) Contusion, 
Lower Extremity Concussion Do not work today.(Electronically signed by GABINO Pineda 2021 21:06)                                                
      13          General Instructions           [facility]         
 Emergency Department     [address] [place] [address]       Phone #: 
[phone] ext- [phone]               2021 1
2:20----------------------------------------------------         Patient: 
VANDANA LORENZANA       MRN: [id number]      Acct#: [id number]     Sex: M : 
1995 Age: 25y  









          Name      Value     Range     Interpretation Code Description Data Gisele

rce(s) Supporting 

Document(s)

 

                                                                       









                    ID                  Date                Data Source

 

                    [id number]      2021 12:30:00 PM EST [facility]

 

                                                                                

                                        

                        1                                   Clinical Report - 
Nurses                                    [facility]                
                   Emergency Department                              [address] [place] [address]                                Phone #: [phone]
[phone] izt- [phone]                                        2021 12:20          
           ----------------------------------------------------                 
                Patient: VANDANA LORENZANA                                MRN: 
[id number]      Acct#: [id number]                              Sex: M : 1995 
Age: 25yTRIAGEArrived by EMS. Historian: EMS. Unaccompanied.Triage time: 12:20 
2021. Acuity: LEVEL 3.Chief Complaint: FALL: 4-5 feet. Lost balance; fell 
onto the ground and hard surface.Patient was wearing a helmet.Alert.Location of 
injuries: head, face and right knee. Occurred at work. Occurred late entry - 
11:3702021. 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 
appeared to have lost his balanceand fell off; Coworkers stated pt was wearing 
helmet but did hit his head but did not lose consciousness.They picked him up 
and carried him to a building. When EMS arrived pt c/o right knee pain, 
headache,and had altered mental status, GCS 14 per EMS. Pt now c/o neck pain. Pt
only remembers post fall.). ( per EMS).Treatment PTA:None.SEPSIS SCREEN: 
SIRS SCREEN NEGATIVE. SEPSIS SCREEN NEGATIVE. No suspected or confirmedsigns of 
infection present. (12:29 2021).JIMENEZ COMA SCORE: 14- eyes open- 
spontaneous (4); best verbal response- confused (4); bestmotor response- obeys 
commands (6). (Pt oriented to self only). --12:30 21 Fernanda Carlos R.N.12:20 21. BP: 125/71. HR: 85. RR: 23. O2 saturation: 100%. Temp: 97.8 
F. Pain level now 810.--12:30 21 Fernanda Carlos R.N.Weight: 72.5 kg 
stated. Height/Length: 67 inches Per Patient. BMI: 25.1. --12:20 21 
Fernanda Carlos R.N.MedicationsNone. --12:23 21 Fernanda Carlos R.N.AllergiesNo Known Drug Allergy. --12:23 21 Fernanda Carlos R.N.PROBLEMS:G6Pd Deficiency. --12:24 21 Fernanda Carlos R.N.Medication/allergy information source: the patient. --12:30 21 Fernanda Carlos R.N.                                                                  
                                                2                               
      Clinical Report - Nurses                                      [facility]                                      Emergency Department         
                      [address] [place] [address]                      
           Phone #: [phone] ext- [phone]                                    
     2021 12:20                        
----------------------------------------------------                            
       Patient: VANDANA LORENZANA                                  MRN: [id number]   
  [id number]                                Sex: M : 1995 Age: 25y
ADDITIONAL SURGERIES: Kidney surgery. --12:24 21 Fernanda Carlos R.N. 
History PAST MEDICAL HX: Tetanus status: up-to-date. Immunizations: up-to-date 
and (Pt received 1 st dose of COVID vaccie). SOCIAL HX: Never smoker. No alcohol
use or drug use. He was offered HIV testing but declined. Patient education was 
provided. He was offered hepatitis C testing but declined. Patient education was
provided. ( COVID screen negative). He has not traveled outside the [place] 
Infectious disease exposure: No infectious disease exposure. The patient was not
exposed to Coronavirus. Mask placed on patient. Patient is not a known carrier 
of tuberculosis, hepatitis, HIV, MRSA or VRE. Patient is not a known carrier of 
CRE. SELF HARM ASSESSMENT: Self harm assessment was performed. The patient 
answered "no" to the question(s) "Do you have thoughts of harming or killing 
yourself?" and "Do you have a plan for harming or killing yourself?". ABUSE 
ASSESSMENT: Abuse assessment. The patient had positive responses to the 
question(s) "Do you feel safe in your home?". Abuse denied. No suspicion of 
abuse. No report of abuse. NUTRITIONAL RISK ASSESSMENT: The nutritional risk 
assessment revealed no deficiencies. FUNCTIONAL ASSESSMENT: Functional 
assessment: no impairments noted. LEARNING NEEDS ASSESSMENT: The learning needs 
assessment revealed no barriers. FALL RISK ASSESSMENT: Fall risk assessment 
completed. Risk factors identified include patient impairment of cognition. Fall
interventions initiated. Patient placed on stretcher. Side rails up x2. Bed in 
low position. Patient visible from nurses' station and identified as a fall 
risk. Electronic bed monitor in use. Call light in reach of patient. Instructed 
not to get up without assistance. Instructions given to patient including fall 
prevention information. Verbalizes understanding. SKIN INTEGRITY ASSESSMENT: 
Skin integrity risk assessment completed. No skin integrity risk identified. 
--12:30 21 Fernanda Carlos R.N. Interventions Identification band on 
patient. --12:30 21 Fernanda Carlos R.N.PHYSICAL [id number] 
21. Ambulatory to room. Patient gowned.GENERAL / NEURO / PSYCH: Alert. 
Oriented X 4. Appears in no acute distress.HEENT: Head non-tender. Neck: 
tenderness.RESPIRATORY: Respirations not labored. Chest nontender. Breath sounds
within normal limits.                                                           
                                                   3                            
        Clinical Report - Nurses                                     [facility]                                     Emergency Department          
                    [address] [place] [address]                        
        Phone #: [phone] ext- [phone]                                       
 2021 12:20                       
----------------------------------------------------                            
      Patient: VANDANA LORENZANA                                 MRN: [id number]     
Acct#: [id number]                               Sex: M : 1995 Age: 25y 
CVS: Right rib area: tenderness. Left rib area: tenderness. Normal heart rate 
and rhythm. Pulses within normal limits. Capillary refill less than 2 seconds. 
GI / : Abdomen soft and nontender. EXTREMITIES: Extremities exhibit normal 
ROM. Neuro-vascular status intact to the extremity. SKIN: Skin intact. Skin is 
warm and dry. --13:07 21 Adrien Rainey R.N.NURSING PROGRESS NOTESHard c-
collar applied (In place on arrival by EMS). Patient gowned. Reassurance given. 
Three patientidentifiers checked. Call light placed in reach. Side rails up x 2.
Bed placed in lowest position. Brakesof bed on. ( Pt placed on Monitor on BP and
O2 oximeter). --12:30 21 Fernanda Carlos R.N. 13:11 2021 Site #1 
started prior to arrival by EMS via IV in the left antecubital space with an 18g
angiocath, with aseptic technique and good blood return; one attempt. Saline 
lock flushed with 10 mL saline. --13:11 21 Adrien Rainey R.N. 13:29 
2021 [facility] * IVPB 1 GRAM        --13:29 21 Adrien Rainey R.N. 
14:09 2021 [facility] IVPB Discontinued: bag #1 completed. Total amount 
infused: 100 mL. IV patency established. IV site checked: no pain, redness, or 
swelling. IV flushed thoroughly. --14:25 21 Adrien Rainey R.N. 15:11 
2021 Site #1 removed upon discharge. Pressure dressing applied. --15:11 
21 Alison Slater R.N.DISPOSITION / DISCHARGE 15:10 21. BP: 122/73. MAP:
89. HR: 78. RR: 18. O2 saturation: 100%. Temp: 97 F. Pain level now: 5/10. 
--15:11 21 Alison Slater R.N. Condition at departure: improved. No learning 
barriers present. Discharge instructions provided and reviewed with the patient.
Reviewed fever care instructions. Patient verbalized understanding. Written 
instructions provided in English. ( use acetaminophen and motrin for pain). The 
patient was discharged home and unaccompanied at time of discharge. He left 
ambulatory and via private vehicle. Patient driving. --15:14 21 Alison Slater R.N. Departure time: 15:14 2021. --15:14 21 Alison Slater R.N.Locked/Released at 2021 15:14 by Alison Slater R.N.  









          Name      Value     Range     Interpretation Code Description Data Gisele

rce(s) Supporting 

Document(s)

 

                                                                       









                    ID                  Date                Data Source

 

                    [id number]      2021 12:30:00 PM [facility]

 

                                                                                

                                        

                           1                           Clinical Report - 
Physicians/Mid Levels                                       [facility]                                       Emergency Department             
                   [address] [place] [address]                         
         Phone #: [phone] ext- [phone]                                      
    2021 12:20                         
----------------------------------------------------                            
        Patient: VANDANA LORENZANA                                   MRN: [id number] 
    Acct#: [id number]                                 Sex: M : 1995 Age: 
25y Time Seen: 12:30 2021. Arrived- By ambulance. Historian- patient and 
EMS personnel.HISTORY OF PRESENT ILLNESS Chief Complaint: FALL: 5-6 feet. 
Tripped. (Fell from train car). Location of injuries- head, neck, chest and 
upper back. The injury occurred just prior to arrival. Fell: Occurred at work. 
The patient complains of moderate pain. The patient sustained a blow to the 
head, complains of neck pain and was dazed. No loss of consciousness or 
seizure.REVIEW OF SYSTEMSThe patient complains of pain on weight bearing. No 
numbness, dizziness, loss of vision, hearing loss orchest pain. No difficulty 
breathing, weakness, headache, nausea or abdominal pain. No laceration,fever, 
vomiting, urinary problems or depression.PAST HISTORYProblems:G6Pd Deficiency. 
Additional Surgeries: Kidney surgery. Medications: None. Allergies: No Known 
Drug Allergy.SOCIAL HISTORYNever smoker. No alcohol use or drug use.PHYSICAL 
EXAMVital Signs: 2021 12:20 BP: 125/71. MAP: 89. HR: 85. RR: 23. O2 
saturation: 100%. Temp: 97.8 F.Have been reviewed as normal. Oxygen saturation 
normal.Appearance: Alert. Oriented X3. No acute distress.Head: No swelling of 
head. Vertex: mild tenderness of the right posterior aspect of the vertex.Eyes: 
Pupils equal, round and reactive to light. EOM intact.ENT: No dental injury. 
Pharynx normal.Neck: Decrease in ROM. Pain in the neck upon movement. Muscle 
spasm of the neck. Vertebral                                                    
                                                           2                    
     Clinical Report - Physicians/Mid Levels                                    
[facility]                                     Emergency Department 
                             [address] [place] [address]               
                 Phone #: [phone] ext- [phone]                              
          2021 12:20                       
----------------------------------------------------                            
      Patient: VANDANA LORENZANA                                 MRN: [id number]     
Acct#: [id number]                               Sex: M : 1995 Age: 25y 
tenderness. CVS: Heart sounds normal. Pulses normal. Respiratory: Breath sounds 
normal. Chest nontender. Abdomen: No visible injury. Soft and nontender. Bowel 
sounds normal. No organomegaly. No mass. Femoral pulses equal. Back: Mild 
tenderness in the right upper and mid and left upper and mid thoracic area. ROM 
normal. Skin: Skin intact. Skin warm and dry. Normal skin color. Normal skin 
turgor. Extremities: Pelvis stable. Right knee: mild tenderness located in the 
patella. Limited ROM secondary to pain. Neurovascular intact distally. No 
ligamentous laxity present. No joint effusion. Neuro: Mildly altered mental 
status: forgetful and disoriented to person, place and time. Patient slow to 
respond. Eyes open- spontaneous. Best verbal response- confused. Best motor 
response- obeys commands. (pt struggles with name, month and day but know the 
[employer] has taken his dignitu and would like to keep his pants on and also 
adamantly reports he doesn't smoke or drink because he is Buddhist). No motor 
deficit. No sensory deficit. Reflexes normal.LABS, X-RAYS, AND EKGCT C-Spine: No
acute findings. (Bone island in the left occiput examination is otherwise 
unremarkable.).The study was interpreted by the radiologist and 
contemporaneously by me. Interpretation time: 14:.CT Head: Normal 
study. Head CT performed without contrast. The study was interpreted by 
theradiologist and contemporaneously by me. Interpretation time: 14:50 
2021.Chest CT: (NAD). Chest CT performed without contrast. The study was 
interpreted by the radiologistand contemporaneously by me. Interpretation time: 
14:51 2021.PROGRESS AND PROCEDURESCourse of Care: 15:03 2021. 
Evaluation after CT scan and observation. (Discussed risks,benefits, options and
pt is agreeable with dx and tx plan.). Patient counseled in person regarding the
patient's stable condition, test results, diagnosis and need for follow-up. 
Patient agrees with plan of care. 15:. Disposition: Discharged 
home in good and improved condition (15:).CLINICAL IMPRESSION 
Minor closed head injury. Concussion. No loss of consciousness. (Mild). Acute 
cervical strain. Acute traumatic thoracic back pain associated with muscle 
strain. Contusion to the right knee. Fall on the same level by tripping.        
                                                                                
                    3                          Clinical Report - Physicians/Mid 
Levels                                     [facility]               
                     Emergency Department                               [address] [place] [address]                                 Phone #: [phone] ext- [phone]                                         2021 12:20     
                 ----------------------------------------------------           
                       Patient: VANDANA LORENZANA                                
MRN: [id number]      Acct#: [id number]                               Sex: M : 
1995 Age: 25yINSTRUCTIONS Do not work today. Warnings: Further evaluation 
is necessary. It is very important to follow up with a healthcare provider. 
GENERAL WARNINGS: Return or contact your physician immediately if your condition
worsens or changes unexpectedly, if not improving as expected, or if other 
problems arise. SPECIFICALLY, return if there is no improvement in the pain. 
Your Current Medications: . No home medication. Follow-up: Follow up with your 
doctor tomorrow even if well. Reason for referral: evaluation and treatment. 
Summary of care provided to patient. Understanding of the discharge instructions
verbalized by patient.(Electronically signed by GABINO Pineda 2021 
21:06)  









          Name      Value     Range     Interpretation Code Description Data Gisele

e(s) Supporting 

Document(s)

 

                                                                       









                    ID                  Date                Data Source

 

                    [id number]     2021 01:31:00 PM [facility]









          Name      Value     Range     Interpretation Code Description Data Gisele

rce(s) Supporting 

Document(s)

 

          Prothrombin time (PT) 12.1 SECONDS 11.0 - 15.5                     [facility]  

 

           INR in Platelet poor plasma by Coagulation assay 0.85       0.93 - 1.

23 L                     [facility]                            

 

           aPTT in Blood by Coagulation assay 31.5 SECONDS 24.8 - 36.7          

             [facility]                                 

 

                                                                      \BLDo\INR 

INTERPRETATION\BLDx\          

Therapeutic range for Coumadin and related oral anticoagulants.          -
International Normalized Ratio (INR): 2.0 - 3.0 for Venous           Thrombosis,
Pulmonary Embolus, Tissue heart valves, Acute MI           Atrial Fibrillation, 
Valvular heart disease and recurrent           Systemic Embolism.          -
International Normalized Ratio (INR): 2.5 - 3.5 for Mechanical           
Prosthetic valve. 









                    ID                  Date                Data Source

 

                    [id number]     2021 01:25:00 PM [facility]









          Name      Value     Range     Interpretation Code Description Data [facility]

rce(s) Supporting 

Document(s)

 

          Ethanol [Moles/volume] in Blood <10.0 MG/DL                           

    [facility]  

 

          ALCOHOL % 0.01 %    0.00 - 0.01                     [facility]

ital  

 

                                                      *FOR MEDICAL PURPOSES ONLY

* 









                    ID                  Date                Data Source

 

                    [id number]     2021 01:25:00 PM [facility]









          Name      Value     Range     Interpretation Code Description Data [facility](s) Supporting 

Document(s)

 

          COMPREHENSIVE METABOLIC PANEL                                         

[facility]  

 

                                            COMPREHENSIVE METABOLIC PANEL 

 

           Sodium [Moles/volume] in Serum or Plasma 140 mEq/L  134 - 153        

                [facility]                                 

 

           Potassium [Moles/volume] in Serum or Plasma 3.8 mEq/L  3.6 - 5.0     

                   [facility]                            

 

           Chloride [Moles/volume] in Serum or Plasma 103 mEq/L  98 - 107       

                  [facility]                                 

 

           Carbon dioxide, total [Moles/volume] in Serum or Plasma 29 MEQ/L   22

 - 30                          

[facility]                   

 

           Glucose [Mass/volume] in Serum or Plasma 78 MG/DL   70 - 99          

                [facility]                                 

 

          BUN       14 MG/DL  7 - 21                        [facility]

al  

 

           Creatinine [Mass/volume] in Serum or Plasma 0.9 MG/DL  0.7 - 1.5     

                   [facility]                            

 

          BUN/CREAT 16        8 - 27                        [facility]

[facility]  

 

           Protein [Mass/volume] in Serum or Plasma 7.5 G/DL   6.3 - 8.2        

                [facility]                                 

 

           Albumin [Mass/volume] in Serum or Plasma 4.7 G/DL   3.9 - 5.0        

                [facility]                                 

 

           Globulin [Mass/volume] in Serum by calculation 2.8 GM/DL  2.4 - 3.2  

                      [facility]                            

 

          A/G RATIO 1.7       0.8 - 2.0                     [facility]

al  

 

           Calcium [Mass/volume] in Serum or Plasma 9.3 MG/DL  8.4 - 10.2       

                [facility]                                 

 

           Bilirubin.total [Mass/volume] in Serum or Plasma 0.8 MG/DL  0.2 - 1.3

                        

[facility]                   

 

                    Alkaline phosphatase [Enzymatic activity/volume] in Serum or

 Plasma 69 U/L              38 - 

126                                             [facility]  

 

                          Aspartate aminotransferase [Enzymatic activity/volume]

 in Serum or Plasma 19 U/L

             5 - 40                                 [facility]  

 

                    Alanine aminotransferase [Enzymatic activity/volume] in Seru

m or Plasma 16 U/L              7

- 56                                            [facility]  

 

          Anion gap 3 in Serum or Plasma 8.0 mmol/L 8.0 - 16.0                  

   [facility] 



 

          AGE       25 yrs                                  [facility]

al  

 

          NON-AA GFR >60 mL/min                               [facility]

ital  

 

          AFR AMER GFR >60 mL/min                               [facility]

spital  

 

                                                                     Male GFR In

terprentation                  20-49 yrs

    >60 mL/min   Normal                  50-59 yrs     >56 mL/min   Normal      
           60-69 yrs     >49 mL/min   Normal                  70-79yrs      >42 
mL/min   Normal                  80 and above  >35 mL/min   Normal              
     Female GFR  Interpretation                  20-39 yrs     >60 mL/min   
Normal                  40-49 yrs     >58 mL/min   Normal                  50-59
yrs     >51 mL/min   Normal                  60-69 yrs     >45 mL/min   Normal  
               70-79 yrs     >39 mL/min   Normal                  80 and above  
>32 mL/min   Normal 









                    ID                  Date                Data Source

 

                    [id number]     2021 01:11:00 PM EST [facility]









          Name      Value     Range     Interpretation Code Description Data [facility]

rce(s) Supporting 

Document(s)

 

          CBC W/AUTOMATED DIFF                                         [facility]  

 

                                            COMPLETE BLOOD COUNT 

 

           Leukocytes [#/volume] in Blood by Automated count 4.2 10^3/uL 4.2 - 1

1.0                       

[facility]                   

 

             Erythrocytes [#/volume] in Blood by Automated count 5.10 10^6/uL 4.

50 - 6.30                

                          [facility]     

 

           Hemoglobin [Mass/volume] in Blood 12.2 g/dL  14.0 - 16.0 L           

          [facility]                                 

 

           Hematocrit [Volume Fraction] of Blood by Automated count 39.1 %     4

1.0 - 51.0 L                     

[facility]                   

 

                    Erythrocyte mean corpuscular volume [Entitic volume] by Auto

mated count 76.7 fL             

80.0 - 94.0     L                               [facility]  

 

                          Erythrocyte mean corpuscular hemoglobin [Entitic mass]

 by Automated count 23.9 

pg           27.0 - 34.0  L                         [facility]  

 

                                        Erythrocyte mean corpuscular hemoglobin 

concentration [Mass/volume] by Automated

 count     31.2 g/dL  31.0 - 36.0                       [facility]  

 

             Erythrocyte distribution width [Ratio] by Automated count 13.1 %   

    11.5 - 14.8                

                          [facility]     

 

           Platelets [#/volume] in Blood by Automated count 243 10^3/uL 150 - 45

0                        

[facility]                   

 

                    Platelet mean volume [Entitic volume] in Blood by Automated 

count 10.7 fL             7.4 - 

10.4            H                               [facility]  

 

           Neutrophils/100 leukocytes in Blood by Automated count 57.4 %     37.

0 - 80.0                       

[facility]                   

 

           Lymphocytes/100 leukocytes in Blood by Manual count 35.2 %     25.0 -

 40.0                       

[facility]                   

 

           Monocytes/100 leukocytes in Blood by Automated count 5.0 %      3.0 -

 8.0                        

[facility]                   

 

           Eosinophils/100 leukocytes in Blood by Automated count 1.7 %      0.0

 - 7.0                        

[facility]                   

 

           Basophils/100 leukocytes in Blood by Automated count 0.5 %      0.0 -

 2.0                        

[facility]                   

 

          %IG       0.2 %     0.0 - 0.0 H                   [facility]

al  

 

          %NRBC     0.0 %     0.0 - 0.0                     [facility]

al  

 

           Neutrophils [#/volume] in Blood by Automated count 2.40 10^3/uL 2.00 

- 6.90                       

[facility]                   

 

           Lymphocytes [#/volume] in Blood by Automated count 1.47 10^3/uL 0.60 

- 3.40                       

[facility]                   

 

           Monocytes [#/volume] in Blood by Automated count 0.21 10^3/uL 0.00 - 

0.90                       

[facility]                   

 

           Eosinophils [#/volume] in Blood by Automated count 0.07 10^3/uL 0.00 

- 0.70                       

[facility]                   

 

           Basophils [#/volume] in Blood by Automated count 0.02 10^3/uL 0.00 - 

0.20                       

[facility]                   

 

          #IG       0.01 10^3/uL 0.00 - 0.10                     [facility]

ospital  

 

          #NRBC     0.00 10^3/uL 0.00 - 0.00                     [facility]

ospital  

 

          MANUAL DIFF NOT INDICATED                               [facility]  

 

          RBC MORPH NOT INDICATED                               [facility]

spital  







                                        Procedure

## 2021-05-12 ENCOUNTER — HOSPITAL ENCOUNTER (EMERGENCY)
Dept: HOSPITAL 53 - M ED | Age: 26
Discharge: HOME | End: 2021-05-12
Payer: COMMERCIAL

## 2021-05-12 VITALS — HEIGHT: 67 IN | WEIGHT: 145.31 LBS | BODY MASS INDEX: 22.81 KG/M2

## 2021-05-12 VITALS — SYSTOLIC BLOOD PRESSURE: 113 MMHG | DIASTOLIC BLOOD PRESSURE: 76 MMHG

## 2021-05-12 DIAGNOSIS — H61.22: Primary | ICD-10-CM

## 2021-05-12 DIAGNOSIS — H93.12: ICD-10-CM

## 2021-05-27 ENCOUNTER — HOSPITAL ENCOUNTER (EMERGENCY)
Dept: HOSPITAL 53 - M ED | Age: 26
Discharge: HOME | End: 2021-05-27
Payer: COMMERCIAL

## 2021-05-27 VITALS — BODY MASS INDEX: 24.38 KG/M2 | HEIGHT: 67 IN | WEIGHT: 155.32 LBS

## 2021-05-27 VITALS — DIASTOLIC BLOOD PRESSURE: 63 MMHG | SYSTOLIC BLOOD PRESSURE: 112 MMHG
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
## 2021-05-27 DIAGNOSIS — S29.012A: ICD-10-CM
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
## 2021-05-27 DIAGNOSIS — M54.5: Primary | ICD-10-CM
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
## 2021-05-27 PROCEDURE — 96372 THER/PROPH/DIAG INJ SC/IM: CPT

## 2021-05-27 PROCEDURE — 99282 EMERGENCY DEPT VISIT SF MDM: CPT

## 2021-07-05 ENCOUNTER — HOSPITAL ENCOUNTER (EMERGENCY)
Dept: HOSPITAL 53 - M ED | Age: 26
LOS: 1 days | Discharge: LEFT BEFORE DISCHARGE | End: 2021-07-06
Payer: COMMERCIAL

## 2021-07-05 VITALS — WEIGHT: 169.76 LBS | BODY MASS INDEX: 26.64 KG/M2 | HEIGHT: 67 IN

## 2021-07-05 VITALS — SYSTOLIC BLOOD PRESSURE: 110 MMHG | DIASTOLIC BLOOD PRESSURE: 69 MMHG

## 2021-07-05 DIAGNOSIS — Z53.21: Primary | ICD-10-CM
